# Patient Record
Sex: MALE | Race: WHITE | Employment: OTHER | URBAN - METROPOLITAN AREA
[De-identification: names, ages, dates, MRNs, and addresses within clinical notes are randomized per-mention and may not be internally consistent; named-entity substitution may affect disease eponyms.]

---

## 2017-07-20 ENCOUNTER — HOSPITAL ENCOUNTER (EMERGENCY)
Facility: HOSPITAL | Age: 62
Discharge: HOME | End: 2017-07-20
Attending: EMERGENCY MEDICINE
Payer: COMMERCIAL

## 2017-07-20 ENCOUNTER — APPOINTMENT (OUTPATIENT)
Dept: CT IMAGING | Facility: HOSPITAL | Age: 62
End: 2017-07-20
Attending: EMERGENCY MEDICINE
Payer: COMMERCIAL

## 2017-07-20 ENCOUNTER — HOSPITAL ENCOUNTER (OUTPATIENT)
Age: 62
Discharge: HOME | End: 2017-07-20
Attending: FAMILY MEDICINE
Payer: COMMERCIAL

## 2017-07-20 ENCOUNTER — APPOINTMENT (OUTPATIENT)
Dept: GENERAL RADIOLOGY | Age: 62
End: 2017-07-20
Attending: FAMILY MEDICINE
Payer: COMMERCIAL

## 2017-07-20 VITALS
DIASTOLIC BLOOD PRESSURE: 92 MMHG | TEMPERATURE: 98 F | HEART RATE: 64 BPM | BODY MASS INDEX: 34.01 KG/M2 | RESPIRATION RATE: 18 BRPM | SYSTOLIC BLOOD PRESSURE: 159 MMHG | OXYGEN SATURATION: 96 % | HEIGHT: 74 IN | WEIGHT: 265 LBS

## 2017-07-20 VITALS
SYSTOLIC BLOOD PRESSURE: 166 MMHG | BODY MASS INDEX: 34.54 KG/M2 | TEMPERATURE: 97 F | HEIGHT: 72 IN | HEART RATE: 70 BPM | RESPIRATION RATE: 20 BRPM | DIASTOLIC BLOOD PRESSURE: 88 MMHG | OXYGEN SATURATION: 96 % | WEIGHT: 255 LBS
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
## 2017-07-20 DIAGNOSIS — M54.2 NECK PAIN: Primary | ICD-10-CM

## 2017-07-20 DIAGNOSIS — R51.9 ACUTE NONINTRACTABLE HEADACHE, UNSPECIFIED HEADACHE TYPE: ICD-10-CM
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
## 2017-07-20 DIAGNOSIS — S16.1XXD CERVICAL STRAIN, SUBSEQUENT ENCOUNTER: ICD-10-CM

## 2017-07-20 DIAGNOSIS — S09.90XD MINOR HEAD INJURY, SUBSEQUENT ENCOUNTER: ICD-10-CM

## 2017-07-20 LAB
GLUCOSE BLD-MCNC: 135 MG/DL (ref 65–99)
GLUCOSE BLD-MCNC: 247 MG/DL (ref 65–99)

## 2017-07-20 PROCEDURE — 99205 OFFICE O/P NEW HI 60 MIN: CPT

## 2017-07-20 PROCEDURE — 82962 GLUCOSE BLOOD TEST: CPT

## 2017-07-20 PROCEDURE — 70450 CT HEAD/BRAIN W/O DYE: CPT | Performed by: EMERGENCY MEDICINE

## 2017-07-20 PROCEDURE — 72125 CT NECK SPINE W/O DYE: CPT | Performed by: EMERGENCY MEDICINE

## 2017-07-20 PROCEDURE — 99284 EMERGENCY DEPT VISIT MOD MDM: CPT

## 2017-07-20 PROCEDURE — 72050 X-RAY EXAM NECK SPINE 4/5VWS: CPT | Performed by: FAMILY MEDICINE

## 2017-07-20 RX ORDER — CARISOPRODOL 350 MG/1
350 TABLET ORAL 3 TIMES DAILY PRN
Qty: 20 TABLET | Refills: 0 | Status: SHIPPED | OUTPATIENT
Start: 2017-07-20

## 2017-07-20 RX ORDER — LISINOPRIL AND HYDROCHLOROTHIAZIDE 12.5; 1 MG/1; MG/1
1 TABLET ORAL 2 TIMES DAILY
COMMUNITY

## 2017-07-20 RX ORDER — BUPROPION HYDROCHLORIDE 100 MG/1
100 TABLET ORAL 2 TIMES DAILY
COMMUNITY

## 2017-07-20 RX ORDER — NAPROXEN 500 MG/1
500 TABLET ORAL 2 TIMES DAILY PRN
Qty: 20 TABLET | Refills: 0 | Status: SHIPPED | OUTPATIENT
Start: 2017-07-20

## 2017-07-20 RX ORDER — DIAZEPAM 5 MG/1
5 TABLET ORAL ONCE
Status: COMPLETED | OUTPATIENT
Start: 2017-07-20 | End: 2017-07-20

## 2017-07-20 RX ORDER — ATORVASTATIN CALCIUM 20 MG/1
20 TABLET, FILM COATED ORAL NIGHTLY
COMMUNITY

## 2017-07-20 RX ORDER — TRAMADOL HYDROCHLORIDE 50 MG/1
50 TABLET ORAL ONCE
Status: COMPLETED | OUTPATIENT
Start: 2017-07-20 | End: 2017-07-20
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
## 2017-07-20 NOTE — ED NOTES
While the patient was back getting his x-rays he began to feel dizzy, lightheaded, and dizzy. Dr. Scarlett Mendez and I went to X-ray to monitor patient. Glucose was 247. Patient continues to have intermittent dizziness and headaches.   Feeling very fatigued &

## (undated) NOTE — LETTER
Date & Time: 7/20/2017, 12:49 PM  Patient: Dorothea Sutton  Attending Provider: Zandra Salinas MD      This certifies that Abby Ziegler, a patient at an [facility] facility, am leaving the facility voluntarily

## (undated) NOTE — LETTER
Date & Time: 7/20/2017, 12:48 PM  Patient: Sravanthi Joseph Herman  Attending Provider: Jason Whipple MD      This certifies that Ashlie Hamilton, a patient at an [facility], am leaving the facility voluntarily

## (undated) NOTE — ED AVS SNAPSHOT
[facility] Emergency Department  [place]  [address] [address]  Phone:  [phone]  Fax:  [phone]          Ellen Suggs   MRN: [id number]    Department:  [facility] Emergency Department   Date of Visit:  7/20/201 IF THERE IS ANY CHANGE OR WORSENING OF YOUR CONDITION, CALL YOUR PRIMARY CARE PHYSICIAN AT ONCE OR RETURN IMMEDIATELY TO THE EMERGENCY DEPARTMENT.     If you have been prescribed any medication(s), please fill your prescription right away and begin taking t